# Patient Record
Sex: FEMALE | Race: WHITE | URBAN - METROPOLITAN AREA
[De-identification: names, ages, dates, MRNs, and addresses within clinical notes are randomized per-mention and may not be internally consistent; named-entity substitution may affect disease eponyms.]

---

## 2023-12-07 ENCOUNTER — OFFICE VISIT (OUTPATIENT)
Dept: OBGYN CLINIC | Facility: CLINIC | Age: 18
End: 2023-12-07

## 2023-12-07 VITALS
SYSTOLIC BLOOD PRESSURE: 119 MMHG | BODY MASS INDEX: 23.36 KG/M2 | HEIGHT: 60 IN | DIASTOLIC BLOOD PRESSURE: 80 MMHG | WEIGHT: 119 LBS

## 2023-12-07 DIAGNOSIS — N92.1 MENORRHAGIA WITH IRREGULAR CYCLE: Primary | ICD-10-CM

## 2023-12-07 PROBLEM — Z86.19 HX OF CHLAMYDIA INFECTION: Status: ACTIVE | Noted: 2023-12-07

## 2023-12-07 PROCEDURE — 3079F DIAST BP 80-89 MM HG: CPT | Performed by: OBSTETRICS & GYNECOLOGY

## 2023-12-07 PROCEDURE — 99213 OFFICE O/P EST LOW 20 MIN: CPT | Performed by: OBSTETRICS & GYNECOLOGY

## 2023-12-07 PROCEDURE — 3074F SYST BP LT 130 MM HG: CPT | Performed by: OBSTETRICS & GYNECOLOGY

## 2023-12-07 PROCEDURE — 3008F BODY MASS INDEX DOCD: CPT | Performed by: OBSTETRICS & GYNECOLOGY

## 2023-12-07 NOTE — PROGRESS NOTES
Amy Jarvis is a 19year old female  Patient's last menstrual period was 2023 (approximate). Chief Complaint   Patient presents with    Irregular Menstruation     Pt states has irregular menstruation X 2 months   Pt states its heavy with clots     Pt here for a problem. She c/o pain in between menses abdominal and vaginal. She c/o iregular bleeding. She tried ocp before but worsened migraines , and she has depression. OBSTETRICS HISTORY:     OB History    Para Term  AB Living   0 0 0 0 0 0   SAB IAB Ectopic Multiple Live Births   0 0 0 0 0       GYNE HISTORY:     Hx Prior Abnormal Pap: No   Period Cycle (Days): irregular (2023 11:00 AM)  Period Duration (Days): 6/7 (2023 11:00 AM)  Period Flow: heavy (2023 11:00 AM)  Use of Birth Control (if yes, specify type): Abstinence (2023 11:00 AM)  Hx Prior Abnormal Pap: No (2023 11:00 AM)         No data to display                  MEDICAL HISTORY:     Past Medical History:   Diagnosis Date    Depression        SURGICAL HISTORY:     History reviewed. No pertinent surgical history.     SOCIAL HISTORY:     Social History     Socioeconomic History    Marital status: Single   Tobacco Use    Smoking status: Never    Smokeless tobacco: Never   Substance and Sexual Activity    Alcohol use: Not Currently    Drug use: Yes     Types: Cannabis     Comment: once a day        FAMILY HISTORY:     Family History   Problem Relation Age of Onset    Other (Other) Mother         fibroidmyalgia    Cancer Paternal Grandmother         ovarian cancer       MEDICATIONS:       Current Outpatient Medications:     teraconazole 0.4 % Vaginal Cream, Place 7 applicators vaginally nightly., Disp: 7 each, Rfl: 0    ALLERGIES:     No Known Allergies      REVIEW OF SYSTEMS:     Constitutional:    denies fever / chills  Eyes:     denies blurred or double vision  Cardiovascular:  denies chest pain or palpitations  Respiratory:    denies shortness of breath  Gastrointestinal:  denies severe abdominal pain, frequent diarrhea or constipation, nausea / vomiting  Genitourinary:    denies dysuria, bothersome incontinence  Skin/Breast:   denies any breast pain, lumps, or discharge  Neurological:    denies frequent severe headaches  Psychiatric:   denies depression or anxiety, thoughts of harming self or others  Heme/Lymph:    denies easy bruising or bleeding      PHYSICAL EXAM:   Blood pressure 119/80, height 5' (1.524 m), weight 119 lb (54 kg), last menstrual period 11/11/2023. Constitutional:  well developed, well nourished  Head/Face:  normocephalic  Neck/Thyroid: thyroid symmetric, no thyromegaly, no nodules, no adenopathy  Lymphatic: no abnormal supraclavicular or axillary adenopathy is noted  Respiratory:      chest wall symmetric and nontender on palpation, clear to asculation bilateral, no wheezing, rales, ronchi, and resonance normal upon percussion  Cardiovascular: chest normal in appearance, regular rate and rhythm, no murmurs, PMI palpated midclavicular line  Abdomen:   soft, nontender, nondistended, no masses  Skin/Hair:  no unusual rashes or bruises  Extremities:  no edema, no cyanosis, non tender bilaterally  Psychiatric:   oriented to time, place, person and situation. Appropriate mood and affect    Pelvic Exam:  External Genitalia:  normal appearance, hair distribution, and no lesions  Urethral Meatus:   normal in size, location, without lesions   Bladder:    no fullness, masses or tenderness  Vagina:    normal appearance without lesions, moderate thick white discharge, cervix friable  Cervix:    No lesions, normal friability   Uterus:    normal in size, 8 wk sized, normal contour, position, mobility, without  motion tenderness  Adnexa:   normal without masses or tenderness  Perineum:   normal  Anus: no hemorroids         ASSESSMENT & PLAN:     Erin Orona was seen today for irregular menstruation.     Diagnoses and all orders for this visit: Menorrhagia with irregular cycle  -     teraconazole 0.4 % Vaginal Cream; Place 7 applicators vaginally nightly. -     Chlamydia/Gc Amplification; Future  I believe that the irregular bleeding might be due to to her vaginal candidiasis. I will treat with Terazol 7. She also has a history of chlamydia and we tested for that today which also could cause cervical bleeding irregularly. I will have her come back in 1 month and redo a sterile speculum exam.  If at that time the bleeding continues to be irregular after today's treatment then we can consider progesterone only birth control pill because of her history of migraines and history of depression. Cultures for gonorrhea and chlamydia done today. Terazol 7 sent to the pharmacy. FOLLOW-UP     Return in about 4 weeks (around 1/4/2024) for gyn prblm.       Nathaniel Orona MD  12/7/2023

## 2023-12-08 LAB
C TRACH DNA SPEC QL NAA+PROBE: NEGATIVE
N GONORRHOEA DNA SPEC QL NAA+PROBE: NEGATIVE

## 2024-01-03 ENCOUNTER — OFFICE VISIT (OUTPATIENT)
Dept: OBGYN CLINIC | Facility: CLINIC | Age: 19
End: 2024-01-03

## 2024-01-03 VITALS
SYSTOLIC BLOOD PRESSURE: 104 MMHG | DIASTOLIC BLOOD PRESSURE: 68 MMHG | WEIGHT: 122.19 LBS | HEIGHT: 60 IN | BODY MASS INDEX: 23.99 KG/M2

## 2024-01-03 DIAGNOSIS — B37.31 VAGINAL CANDIDIASIS: Primary | ICD-10-CM

## 2024-01-03 PROCEDURE — 3074F SYST BP LT 130 MM HG: CPT | Performed by: OBSTETRICS & GYNECOLOGY

## 2024-01-03 PROCEDURE — 99212 OFFICE O/P EST SF 10 MIN: CPT | Performed by: OBSTETRICS & GYNECOLOGY

## 2024-01-03 PROCEDURE — 3078F DIAST BP <80 MM HG: CPT | Performed by: OBSTETRICS & GYNECOLOGY

## 2024-01-03 PROCEDURE — 3008F BODY MASS INDEX DOCD: CPT | Performed by: OBSTETRICS & GYNECOLOGY

## 2024-01-03 NOTE — PROGRESS NOTES
Puja Patterson is a 18 year old female  Patient's last menstrual period was 2023 (exact date).   Chief Complaint   Patient presents with    Gyn Exam     Vaginal discharge f/u; discuss irregular bleeding   Pt took ter 7, since then has not had irregular bleeding    OBSTETRICS HISTORY:     OB History    Para Term  AB Living   0 0 0 0 0 0   SAB IAB Ectopic Multiple Live Births   0 0 0 0 0       GYNE HISTORY:         Period Cycle (Days): irregular (2023 11:00 AM)  Period Duration (Days): 6/7 (2023 11:00 AM)  Period Flow: heavy (2023 11:00 AM)  Use of Birth Control (if yes, specify type): Abstinence (2023 11:00 AM)  Hx Prior Abnormal Pap: No (2023 11:00 AM)         No data to display                  MEDICAL HISTORY:     Past Medical History:   Diagnosis Date    Depression     Migraines        SURGICAL HISTORY:     History reviewed. No pertinent surgical history.    SOCIAL HISTORY:     Social History     Socioeconomic History    Marital status: Single   Tobacco Use    Smoking status: Never    Smokeless tobacco: Never   Substance and Sexual Activity    Alcohol use: Not Currently    Drug use: Yes     Types: Cannabis     Comment: once a day        FAMILY HISTORY:     Family History   Problem Relation Age of Onset    Other (Other) Mother         fibroidmyalgia    Cancer Paternal Grandmother         ovarian cancer       MEDICATIONS:       Current Outpatient Medications:     teraconazole 0.4 % Vaginal Cream, Place 7 applicators vaginally nightly., Disp: 7 each, Rfl: 0    ALLERGIES:     No Known Allergies      REVIEW OF SYSTEMS:     Constitutional:    denies fever / chills  Eyes:     denies blurred or double vision  Cardiovascular:  denies chest pain or palpitations  Respiratory:    denies shortness of breath  Gastrointestinal:  denies severe abdominal pain, frequent diarrhea or constipation, nausea / vomiting  Genitourinary:    denies dysuria, bothersome  incontinence  Skin/Breast:   denies any breast pain, lumps, or discharge  Neurological:    denies frequent severe headaches  Psychiatric:   denies depression or anxiety, thoughts of harming self or others  Heme/Lymph:    denies easy bruising or bleeding      PHYSICAL EXAM:   Blood pressure 104/68, height 5' (1.524 m), weight 122 lb 3.2 oz (55.4 kg), last menstrual period 12/05/2023.  Constitutional:  well developed, well nourished  Head/Face:  normocephalic  Neck/Thyroid: thyroid symmetric, no thyromegaly, no nodules, no adenopathy  Lymphatic: no abnormal supraclavicular or axillary adenopathy is noted  Respiratory:      chest wall symmetric and nontender on palpation, clear to asculation bilateral, no wheezing, rales, ronchi, and resonance normal upon percussion  Cardiovascular: chest normal in appearance, regular rate and rhythm, no murmurs, PMI palpated midclavicular line  Breast:   normal without palpable masses, tenderness, asymmetry, nipple discharge, nipple retraction or skin changes  Abdomen:   soft, nontender, nondistended, no masses  Skin/Hair:  no unusual rashes or bruises  Extremities:  no edema, no cyanosis, non tender bilaterally  Psychiatric:   oriented to time, place, person and situation. Appropriate mood and affect    Pelvic Exam:  External Genitalia:  normal appearance, hair distribution, and no lesions  Urethral Meatus:   normal in size, location, without lesions   Bladder:    no fullness, masses or tenderness  Vagina:    normal appearance without lesions, no abnormal discharge  Cervix:    No lesions, normal friability , cream that she put last night is intravaginal  Uterus:    normal in size, 8 wk sized, normal contour, position, mobility, without  motion tenderness  Adnexa:   normal without masses or tenderness  Perineum:   normal  Anus: no hemorroids         ASSESSMENT & PLAN:     Puja was seen today for gyn exam.    Diagnoses and all orders for this visit:    Vaginal candidiasis    Pt felix  in treatment, and will rtc if discharge does not resolve by 2 wks from  now      FOLLOW-UP     No follow-ups on file.      Kulwant Downing MD  1/3/2024

## 2024-11-26 ENCOUNTER — OFFICE VISIT (OUTPATIENT)
Dept: OBGYN CLINIC | Facility: CLINIC | Age: 19
End: 2024-11-26
Payer: COMMERCIAL

## 2024-11-26 VITALS
WEIGHT: 127 LBS | DIASTOLIC BLOOD PRESSURE: 72 MMHG | HEART RATE: 77 BPM | BODY MASS INDEX: 23.37 KG/M2 | SYSTOLIC BLOOD PRESSURE: 125 MMHG | HEIGHT: 62 IN

## 2024-11-26 DIAGNOSIS — B37.31 VAGINAL CANDIDIASIS: ICD-10-CM

## 2024-11-26 DIAGNOSIS — Z01.419 NORMAL GYNECOLOGIC EXAMINATION: Primary | ICD-10-CM

## 2024-11-26 PROCEDURE — 3008F BODY MASS INDEX DOCD: CPT | Performed by: OBSTETRICS & GYNECOLOGY

## 2024-11-26 PROCEDURE — 99395 PREV VISIT EST AGE 18-39: CPT | Performed by: OBSTETRICS & GYNECOLOGY

## 2024-11-26 PROCEDURE — 3078F DIAST BP <80 MM HG: CPT | Performed by: OBSTETRICS & GYNECOLOGY

## 2024-11-26 PROCEDURE — 3074F SYST BP LT 130 MM HG: CPT | Performed by: OBSTETRICS & GYNECOLOGY

## 2024-11-26 RX ORDER — TERCONAZOLE 4 MG/G
1 CREAM VAGINAL NIGHTLY
Qty: 7 EACH | Refills: 0 | Status: SHIPPED | OUTPATIENT
Start: 2024-11-26 | End: 2024-12-03

## 2024-11-26 RX ORDER — FLUCONAZOLE 150 MG/1
150 TABLET ORAL ONCE
COMMUNITY
Start: 2024-11-12

## 2024-11-26 RX ORDER — METRONIDAZOLE 500 MG/1
TABLET ORAL
COMMUNITY
Start: 2024-11-12

## 2024-11-26 NOTE — PROGRESS NOTES
Puja Patterson is a 19 year old female  Patient's last menstrual period was 2024 (exact date).   Chief Complaint   Patient presents with    Annual   Annual exam. Menses every month, lasting 5 to 6 days. Hx migraines.     OBSTETRICS HISTORY:     OB History    Para Term  AB Living   0 0 0 0 0 0   SAB IAB Ectopic Multiple Live Births   0 0 0 0 0       GYNE HISTORY:         Period Cycle (Days): irregular (2023 11:00 AM)  Period Duration (Days):  (2023 11:00 AM)  Period Flow: heavy (2023 11:00 AM)  Use of Birth Control (if yes, specify type): Abstinence (2023 11:00 AM)  Hx Prior Abnormal Pap: No (2023 11:00 AM)         No data to display                  MEDICAL HISTORY:     Past Medical History:    Anxiety    Used to see Psychiatrist for    Chlamydia    Treated with medication    Decorative tattoo    Depression    Dysmenorrhea    Since first menstruation    Migraine headache with aura    Used to see Neurologist for    Migraine headache without aura    Used to see Neurologist for    Migraines       SURGICAL HISTORY:     History reviewed. No pertinent surgical history.    SOCIAL HISTORY:     Social History     Socioeconomic History    Marital status: Single   Tobacco Use    Smoking status: Never     Passive exposure: Never    Smokeless tobacco: Never   Vaping Use    Vaping status: Never Used   Substance and Sexual Activity    Alcohol use: Not Currently     Alcohol/week: 4.0 standard drinks of alcohol     Types: 2 Shots of liquor, 2 Standard drinks or equivalent per week     Comment: once every two weeks, usually when out dancing with friends    Drug use: Yes     Types: Cannabis     Comment: History using LSD and Shrooms. Haven't touched since         FAMILY HISTORY:     Family History   Problem Relation Age of Onset    Other (Other) Mother         fibroidmyalgia    Cancer Paternal Grandmother         ovarian cancer    Stroke Maternal Grandfather         Cause of  death    Diabetes Maternal Grandmother     Ovarian Cancer Maternal Grandmother         Cause of death       MEDICATIONS:       Current Outpatient Medications:     fluconazole 150 MG Oral Tab, Take 1 tablet (150 mg total) by mouth one time., Disp: , Rfl:     metRONIDAZOLE 500 MG Oral Tab, , Disp: , Rfl:     teraconazole 0.4 % Vaginal Cream, Place 1 applicator vaginally nightly for 7 days., Disp: 7 each, Rfl: 0    teraconazole 0.4 % Vaginal Cream, Place 7 applicators vaginally nightly., Disp: 7 each, Rfl: 0    ALLERGIES:     Allergies[1]      REVIEW OF SYSTEMS:     Constitutional:    denies fever / chills  Eyes:     denies blurred or double vision  Cardiovascular:  denies chest pain or palpitations  Respiratory:    denies shortness of breath  Gastrointestinal:  denies severe abdominal pain, frequent diarrhea or constipation, nausea / vomiting  Genitourinary:    denies dysuria, bothersome incontinence  Skin/Breast:   denies any breast pain, lumps, or discharge  Neurological:    denies frequent severe headaches  Psychiatric:   denies depression or anxiety, thoughts of harming self or others  Heme/Lymph:    denies easy bruising or bleeding      PHYSICAL EXAM:   Blood pressure 125/72, pulse 77, height 5' 2\" (1.575 m), weight 127 lb (57.6 kg), last menstrual period 11/07/2024.  Constitutional:  well developed, well nourished  Head/Face:  normocephalic  Neck/Thyroid: thyroid symmetric, no thyromegaly, no nodules, no adenopathy  Lymphatic: no abnormal supraclavicular or axillary adenopathy is noted  Respiratory:      chest wall symmetric and nontender on palpation, clear to asculation bilateral, no wheezing, rales, ronchi, and resonance normal upon percussion  Cardiovascular: chest normal in appearance, regular rate and rhythm, no murmurs, PMI palpated midclavicular line  Breast:   normal bilaterally without palpable masses, tenderness, asymmetry, nipple discharge, nipple retraction or skin changes bilaterally  Abdomen:    soft, nontender, nondistended, no masses  Skin/Hair:  no unusual rashes or bruises  Extremities:  no edema, no cyanosis, non tender bilaterally  Psychiatric:   oriented to time, place, person and situation. Appropriate mood and affect    Pelvic Exam:  External Genitalia:  normal appearance, hair distribution, and no lesions  Urethral Meatus:   normal in size, location, without lesions   Bladder:    no fullness, masses or tenderness  Vagina:    normal appearance without lesions, moderate thick white discharge, adherent  Cervix:    No lesions, normal friability   Uterus:    normal in size, 8 wk sized, normal contour, position, mobility, without  motion tenderness  Adnexa:   normal without masses or tenderness  Perineum:   normal  Anus: no hemorroids         ASSESSMENT & PLAN:     Puja was seen today for annual.    Diagnoses and all orders for this visit:    Normal gynecologic examination  -     ThinPrep Pap with HPV Reflex, Chlamydia/GC; Future  -     Chlamydia/Gc Amplification; Future  Nutrition, weight screening and exercise were discussed with the patient.  Exercise should encompass approximately 150 minutes/week.  Self breast exam counseling was also given.  I advised the patient to avoid tobacco, drugs and alcohol.  Influenza vaccine was offered if seasonally appropriate.  HPV and STD prevention counseling was given.  Health maintenance checklist  was reviewed including Pap smear, cervical cultures, and mammogram screening if age-appropriate.  Appropriate follow-up scheduling was discussed with the patient.      Vaginal candidiasis  -     teraconazole 0.4 % Vaginal Cream; Place 1 applicator vaginally nightly for 7 days.      F/u one month to r/o recurrent candida    FOLLOW-UP     Return in about 1 year (around 11/26/2025) for Annual exam.      Kulwant Downing MD  11/26/2024       [1] No Known Allergies

## 2024-11-27 LAB
C TRACH DNA SPEC QL NAA+PROBE: NEGATIVE
N GONORRHOEA DNA SPEC QL NAA+PROBE: NEGATIVE

## 2024-12-04 LAB — HPV E6+E7 MRNA CVX QL NAA+PROBE: POSITIVE

## 2024-12-26 ENCOUNTER — OFFICE VISIT (OUTPATIENT)
Dept: OBGYN CLINIC | Facility: CLINIC | Age: 19
End: 2024-12-26
Payer: COMMERCIAL

## 2024-12-26 VITALS
BODY MASS INDEX: 25.13 KG/M2 | HEART RATE: 84 BPM | DIASTOLIC BLOOD PRESSURE: 68 MMHG | HEIGHT: 60 IN | WEIGHT: 128 LBS | SYSTOLIC BLOOD PRESSURE: 106 MMHG

## 2024-12-26 DIAGNOSIS — B37.31 VAGINAL CANDIDIASIS: Primary | ICD-10-CM

## 2024-12-26 PROCEDURE — 3078F DIAST BP <80 MM HG: CPT | Performed by: OBSTETRICS & GYNECOLOGY

## 2024-12-26 PROCEDURE — 3008F BODY MASS INDEX DOCD: CPT | Performed by: OBSTETRICS & GYNECOLOGY

## 2024-12-26 PROCEDURE — 3074F SYST BP LT 130 MM HG: CPT | Performed by: OBSTETRICS & GYNECOLOGY

## 2024-12-26 PROCEDURE — 99212 OFFICE O/P EST SF 10 MIN: CPT | Performed by: OBSTETRICS & GYNECOLOGY

## 2024-12-26 NOTE — PROGRESS NOTES
Puja Patterson is a 19 year old female  Patient's last menstrual period was 2024 (exact date).   Chief Complaint   Patient presents with    Follow - Up     Pt here for follow up from yeast infection she finished treatment of terconazole but still feels like she has a yeast infection . Has had some sharp pain        OBSTETRICS HISTORY:     OB History    Para Term  AB Living   0 0 0 0 0 0   SAB IAB Ectopic Multiple Live Births   0 0 0 0 0       GYNE HISTORY:         No data recorded      Latest Ref Rng & Units 2024    10:13 AM   RECENT PAP RESULTS   INTERPRETATION/RESULT: Negative for intraepithelial lesion or malignancy Atypical squamous cells of undetermined significance (ASC-US)    HPV Negative Positive          MEDICAL HISTORY:     Past Medical History:    Anxiety    Used to see Psychiatrist for    Chlamydia    Treated with medication    Decorative tattoo    Depression    Dysmenorrhea    Since first menstruation    Migraine headache with aura    Used to see Neurologist for    Migraine headache without aura    Used to see Neurologist for    Migraines       SURGICAL HISTORY:     History reviewed. No pertinent surgical history.    SOCIAL HISTORY:     Social History     Socioeconomic History    Marital status: Single   Tobacco Use    Smoking status: Never     Passive exposure: Never    Smokeless tobacco: Never   Vaping Use    Vaping status: Never Used   Substance and Sexual Activity    Alcohol use: Not Currently     Alcohol/week: 4.0 standard drinks of alcohol     Types: 2 Shots of liquor, 2 Standard drinks or equivalent per week     Comment: once every two weeks, usually when out dancing with friends    Drug use: Yes     Types: Cannabis     Comment: History using LSD and Shrooms. Haven't touched since         FAMILY HISTORY:     Family History   Problem Relation Age of Onset    Other (Other) Mother         fibroidmyalgia    Cancer Paternal Grandmother         ovarian cancer     Stroke Maternal Grandfather         Cause of death    Diabetes Maternal Grandmother     Ovarian Cancer Maternal Grandmother         Cause of death       MEDICATIONS:       Current Outpatient Medications:     fluconazole 150 MG Oral Tab, Take 1 tablet (150 mg total) by mouth one time., Disp: , Rfl:     metRONIDAZOLE 500 MG Oral Tab, , Disp: , Rfl:     ALLERGIES:     Allergies[1]      REVIEW OF SYSTEMS:     Constitutional:    denies fever / chills  Eyes:     denies blurred or double vision  Cardiovascular:  denies chest pain or palpitations  Respiratory:    denies shortness of breath  Gastrointestinal:  denies severe abdominal pain, frequent diarrhea or constipation, nausea / vomiting  Genitourinary:    denies dysuria, bothersome incontinence  Skin/Breast:   denies any breast pain, lumps, or discharge  Neurological:    denies frequent severe headaches  Psychiatric:   denies depression or anxiety, thoughts of harming self or others  Heme/Lymph:    denies easy bruising or bleeding      PHYSICAL EXAM:   Blood pressure 106/68, pulse 84, height 5' (1.524 m), weight 128 lb (58.1 kg), last menstrual period 12/03/2024, not currently breastfeeding.  Constitutional:  well developed, well nourished    Pelvic Exam:  External Genitalia:  normal appearance, hair distribution, and no lesions  Urethral Meatus:   normal in size, location, without lesions   Bladder:    no fullness, masses or tenderness  Vagina:    normal appearance without lesions, no abnormal discharge        ASSESSMENT & PLAN:     Puja was seen today for follow - up.    Diagnoses and all orders for this visit:    Vaginal candidiasis      Resolved.       FOLLOW-UP     Return for Annual exam.      Kulwant Downing MD  12/26/2024       [1] No Known Allergies

## 2025-01-13 ENCOUNTER — TELEPHONE (OUTPATIENT)
Dept: OBGYN CLINIC | Facility: CLINIC | Age: 20
End: 2025-01-13

## 2025-01-13 NOTE — TELEPHONE ENCOUNTER
Pls call pt. I want to make sure she knows that pap was abnormal and to return one year for repeat pap

## 2025-01-13 NOTE — TELEPHONE ENCOUNTER
Patient verified name and     Patient informed of results and recommendations. Wishes to schedule closer to appointment time.    Routing to pap follow up pool.